# Patient Record
Sex: MALE | Race: WHITE | URBAN - METROPOLITAN AREA
[De-identification: names, ages, dates, MRNs, and addresses within clinical notes are randomized per-mention and may not be internally consistent; named-entity substitution may affect disease eponyms.]

---
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
## 2023-05-25 ENCOUNTER — TELEPHONE (OUTPATIENT)
Dept: OPHTHALMOLOGY | Facility: CLINIC | Age: 23
End: 2023-05-25
Payer: COMMERCIAL

## 2023-05-25 NOTE — TELEPHONE ENCOUNTER
Spoke to mother at 1213    Mother calling to help assist in scheduling    Flashing of light in both eyes lasting about 5 minutes for long period of time    Longstanding floaters     Pt has seen retina provider about 1.5 years ago per mother for symptoms    Pt states symptoms bothersome.    Reviewed able to schedule tomorrow/next with Optometry--general ophthalmology booking out.    Scheduled next Thursday in open new time slot with Dr. Bruno.    Mother aware of date/time/location/duration/main clinic number/parking    Pt with [employer] insurance and mother states will call access center to update information    Ryan Clinton RN 12:36 PM 05/25/23        [facility] Call Center    Phone Message    May a detailed message be left on voicemail: yes     Reason for Call: Appointment Intake    Referring Provider Name: n/a  Diagnosis and/or Symptoms: Flashers and Floaters, both    Per protocol writer is to send te scheduling.    Pt's mom will like to be contacted for the appt     Action Taken: Message routed to:  [facility] ([facility] eye    Travel Screening: Not Applicable

## 2023-05-31 DIAGNOSIS — G43.109 OCULAR MIGRAINE: Primary | ICD-10-CM

## 2023-05-31 NOTE — PROGRESS NOTES
/HPI:  Patient complains of seeing flashes in both eyes that lasts for seconds to 5 minutes. Symptoms started 2 months ago.     Social history: Student and working. Studies environmental sciences.       Pertinent Medical History:    Otitis media, left ear 11/09/2021    Migraine with aura    Ocular History:    Flashes, both eyes.     Migraine with aura.     Eye Medications:    None    Assessment and Plan:  #   Migraine with Aura  #   Ocular Migraine - evolving.     Visual field 06/01/2023: Both eyes: no defects    Optic nerve OCT 06/01/2023: Both eyes: no edema, no pallor.     Patient sees a spot of light which blocks the vision for the past 2 months. The spot consists of different shapes. The spot of light is seen in the  right eye more than the left eye. The light lasts for a few seconds to 5 minutes. No headaches afterwards. There is a history of migraine with aura. The past auras looks different - half of the vision is missing typically.    No papilledema is seen in both eyes. Given ocular migraine is a neurological phenomenon, recommend follow up with PCP. Patient defers MRI brain today. He is interested in talking to his PCP more before proceeding with MRI imaging. Plan on follow up with PCP for migraine with evolving aura. Referral placed since patient is in between primary care doctors.     Patient consented to a dilated eye exam:    Yes. Side effects discussed.    Medical History:  No past medical history on file.    Medications:  No current outpatient medications on file.   Complete documentation of historical and exam elements from today's encounter can be found in the full encounter summary report (not reduplicated in this progress note). I personally obtained the chief complaint(s) and history of present illness.  I confirmed and edited as necessary the review of systems, past medical/surgical history, family history, social history, and examination findings as documented by others; and I examined the  patient myself. I personally reviewed the relevant tests, images, and reports as documented above. I formulated and edited as necessary the assessment and plan and discussed the findings and management plan with the patient and family. - Stephany Lin OD

## 2023-06-01 ENCOUNTER — OFFICE VISIT (OUTPATIENT)
Dept: OPHTHALMOLOGY | Facility: CLINIC | Age: 23
End: 2023-06-01
Attending: OPTOMETRIST
Payer: COMMERCIAL

## 2023-06-01 DIAGNOSIS — G43.109 OCULAR MIGRAINE: ICD-10-CM

## 2023-06-01 DIAGNOSIS — G43.101 MIGRAINE WITH AURA AND WITH STATUS MIGRAINOSUS, NOT INTRACTABLE: Primary | ICD-10-CM

## 2023-06-01 PROBLEM — F41.9 ANXIETY: Status: ACTIVE | Noted: 2022-12-31

## 2023-06-01 PROCEDURE — G0463 HOSPITAL OUTPT CLINIC VISIT: HCPCS | Performed by: OPTOMETRIST

## 2023-06-01 PROCEDURE — 92133 CPTRZD OPH DX IMG PST SGM ON: CPT | Performed by: OPTOMETRIST

## 2023-06-01 PROCEDURE — 92004 COMPRE OPH EXAM NEW PT 1/>: CPT | Performed by: OPTOMETRIST

## 2023-06-01 PROCEDURE — 92083 EXTENDED VISUAL FIELD XM: CPT | Performed by: OPTOMETRIST

## 2023-06-01 ASSESSMENT — TONOMETRY
OS_IOP_MMHG: 15
IOP_METHOD: ICARE
OD_IOP_MMHG: 16

## 2023-06-01 ASSESSMENT — VISUAL ACUITY
OD_CC: 20/20
OS_CC: 20/20
METHOD: SNELLEN - LINEAR

## 2023-06-01 ASSESSMENT — CONF VISUAL FIELD: COMMENTS: VF TODAY

## 2023-06-01 ASSESSMENT — SLIT LAMP EXAM - LIDS
COMMENTS: NORMAL
COMMENTS: NORMAL

## 2023-06-01 ASSESSMENT — EXTERNAL EXAM - RIGHT EYE: OD_EXAM: NORMAL

## 2023-06-01 ASSESSMENT — EXTERNAL EXAM - LEFT EYE: OS_EXAM: NORMAL

## 2023-06-01 NOTE — NURSING NOTE
"Chief Complaints and History of Present Illnesses   Patient presents with     Annual Eye Exam     Chief Complaint(s) and History of Present Illness(es)     Annual Eye Exam           Comments    Pt states he occ gets a  \"bright spot:\" in vision lasting a few seconds to a few minutes, mostly in the right eye X2 months  C/o occ dry eyes  No flashes, floaters eye pain or redness    Genevieve Chu COT 2:23 PM June 1, 2023                      "

## 2024-03-21 ENCOUNTER — TRANSCRIBE ORDERS (OUTPATIENT)
Dept: OTHER | Age: 24
End: 2024-03-21

## 2024-03-21 DIAGNOSIS — H53.19 VISUAL SNOW SYNDROME: Primary | ICD-10-CM

## 2024-03-21 DIAGNOSIS — H52.203 ASTIGMATISM OF BOTH EYES, UNSPECIFIED TYPE: ICD-10-CM

## 2024-03-21 DIAGNOSIS — H52.209 ASTIGMATISM: ICD-10-CM

## 2024-03-21 DIAGNOSIS — H52.13 MYOPIA, BILATERAL: ICD-10-CM
